# Patient Record
(demographics unavailable — no encounter records)

---

## 2024-10-10 NOTE — CARDIOLOGY SUMMARY
[de-identified] : 3/21/24 : Sinus   68 bpm LVH and NSST T changes.  9/16/21 : Sinus 60 Voltage for LVH, Nonspecific ST-T changes.  [de-identified] : 11/4/22 : Patient had a min HR of 36 bpm, max HR of 160 bpm, and avg HR of 58\par  bpm. Predominant underlying rhythm was Sinus Rhythm. 1 run of NSVT\par  occurred lasting 5 beats with a max rate of 160 bpm (avg 133 bpm).\par  2 SVT runs occurred, the run with the fastest interval lasting 4\par  beats with a max rate of 152 bpm, the longest lasting 8 beats with\par  an avg rate of 108 bpm. 1 Pause occurred lasting 3.4 secs (18 bpm).\par  Isolated SVEs were rare (<1.0%, 267), SVE Couplets were rare (<1.0%,\par  18), and SVE Triplets were rare (<1.0%, 4). Isolated VEs were rare\par  (<1.0%, 1973), VE Couplets were rare (<1.0%, 177), and VE Triplets\par  were rare (<1.0%, 1). Ventricular Trigeminy was present. No symptoms\par  reported [de-identified] : 2/09/21 : MACV, calcified mitral leaflets with decreased diastolic opening.  There is systolic anterior motion of the mitral valve leaflet.  Peak mitral valve gradient equals 19 mmHg, mean transmitral valve gradient equals 8 mmHg, estimated mitral valve area equals 1.4 cm consistent with moderate mitral stenosis.  Bioprosthetic aortic valve replacement peak transaortic valve gradient equals 36 mmHg, mean transaortic valve gradient equals 23 mmHg, which is probably normal in the presence of a bioprosthetic aortic valve.  No aortic valve regurgitation seen.  Severely dilated left atrium.  At least mild concentric left ventricular hypertrophy.  Endocardium not well visualized; left ventricular systolic function appears hyperdynamic.  Endocardial visualization enhanced with intravenous injection of ultrasonic enhancing agent.  EF equals greater than 70%.  Peak LV outflow tract gradient equals 87 mmHg, consistent with significant LVOT obstruction septal motion consistent with cardiac surgery.  The right ventricle is not well visualized grossly normal right ventricular systolic function.  The tricuspid valve not well visualized probably normal mild tricuspid regurgitation.

## 2024-10-10 NOTE — ASSESSMENT
[FreeTextEntry1] : Romaine Ivan is a 91-year-old man with hypertension, HOCM, AS s/p AVR, with paroxysmal atrial fibrillation.   1) Atrial fibrillation : Remains in sinus rhythm.   On Disopyramide/Mestinon No clinical evidence of any atrial fibrillation.  2) HCM :  Notes from Dr. Cloud reviewed. Patient tolerating disopyramide and Mestinon  without AF.   3) Pause:  Noted on Zio patch . Discussed with him and his son.  He is using dental appliance for KOKO.

## 2024-10-10 NOTE — HISTORY OF PRESENT ILLNESS
[FreeTextEntry1] : Romaine Ivan is an 91-year-old man with hypertension, HOCM, AS s/p AVR, with paroxysmal atrial fibrillation.  He is feeling well without new complaints.  Zio patch monitor in Augus 2023 demonstrated a 3.1 sec pause. as well as 2 brief runs atrial tachycardia.  Utilizing a dental appliance for KOKO.

## 2024-12-06 NOTE — PHYSICAL EXAM
[No LAD] : no lymphadenopathy [Thyroid Not Enlarged] : the thyroid was not enlarged [No Thyroid Nodules] : no palpable thyroid nodules [No Respiratory Distress] : no respiratory distress [No Accessory Muscle Use] : no accessory muscle use [Clear to Auscultation] : lungs were clear to auscultation bilaterally [Normal to Percussion] : lungs were normal to percussion [Normal PMI] : the apical impulse was normal [Oriented x3] : oriented to person, place, and time [Normal Affect] : the affect was normal [Normal Insight/Judgement] : insight and judgment were intact [de-identified] : Obese male, ambulate with a cane [de-identified] : No proptosis [de-identified] : Normal hearing. [de-identified] : there's lymphedema on the left leg, mild on the right leg.

## 2024-12-06 NOTE — ASSESSMENT
[FreeTextEntry1] : Patient is a 92 -year-old man with history of osteoporosis, compression fractures, type 2 diabetes, hyperlipidemia, here for endocrinology follow-up.  1.  Osteoporosis Schedule Prolia with RN. Patient's overall density similar compared to 2018.  2022 likely an overestimate of his T-score based on current  report No falls or fractures.  DXA report: Total hip 2/6/2024 age 91: BMD 0.668, T-score -2.4, -10.2% #, -11.4%* 12/1/2022: Age 90: BMD 0.754, T-score -1.8, 2.5% #, 6.6%* 5/3/2021: Age 88: BMD 0.707, T-score -2.2, -3.8% #, 4.9%* 7/16/2019 age 87: BMD 0.674, T-score -2.4, -8.3% pound, -8.3% pound 3/26/2018: Age 85: BMD 0.735, T-score -2.0  Femoral neck 2/6/2024: BMD 0.535, T-score -2.9, -4.9%*, -18.4%*, however likely unreliable 12/1/2022: BMD 0.656, T-score -2.0, 16.9%*, +19.4%* 5/3/2021: BMD 0.549, T-score -2.8, -2.1% #, 3.7% 7/16/2019: BMD 0.530, T-score -2.9, -5.6%#, -5.6%# 3/26/2018: BMD 0.561, T-score -2.7  33% 2/6/2024: BMD 0.720, T-score -1.8, 1.6%, -2.8% 12/1/2022: BMD 0.741, T-score -1.4, 4.5%*, +8.9%* 5/3/2021: BMD 0.681, T-score -2.6, -4.0%*, -3.0% 7/16/2019: BMD 0.703, T-score -2.1, -0.8%, -0.8% 3/26/2018: BMD 0.709, T-score -2.0  This shows improvement of bone density over the last 2 years. Recommend to continue with Prolia treatment  2.  Type 2 diabetes There was a significant increase in A1c level and severe hyperglycemia over the last few weeks. Imaging in the past and GI doctor mentioned the potential diagnosis of chronic pancreatitis? Will check C-peptide level. Change metformin 500 mg once daily to metformin  mg once daily then increase to metformin ER twice daily in the next 2 weeks. If his C-peptide level is slow and we are concerning for diabetes type 3 C secondary to pancreatic insufficiency, we had a long discussions the patient may need basal insulin. At that point we will bring patient back sooner for insulin pen teaching. Continue to monitor glucose before every meal and at bedtime.   3.  Hyperlipidemia Continue with statin therapy.  4.  On amiodarone therapy Continue to monitor TFT with PCP  5.  Hypertension BP goal less than 130/80 Continue with amlodipine 2.5 mg once daily Continue with metoprolol ER 1 tablet once daily.  FU in 6 months with me.

## 2024-12-06 NOTE — PHYSICAL EXAM
[No LAD] : no lymphadenopathy [Thyroid Not Enlarged] : the thyroid was not enlarged [No Thyroid Nodules] : no palpable thyroid nodules [No Respiratory Distress] : no respiratory distress [No Accessory Muscle Use] : no accessory muscle use [Clear to Auscultation] : lungs were clear to auscultation bilaterally [Normal to Percussion] : lungs were normal to percussion [Normal PMI] : the apical impulse was normal [Oriented x3] : oriented to person, place, and time [Normal Affect] : the affect was normal [Normal Insight/Judgement] : insight and judgment were intact [de-identified] : Obese male, ambulate with a cane [de-identified] : No proptosis [de-identified] : Normal hearing. [de-identified] : there's lymphedema on the left leg, mild on the right leg.

## 2024-12-06 NOTE — HISTORY OF PRESENT ILLNESS
[FreeTextEntry1] : Patient is a 92-year-old man with history of type 2 diabetes, A1c well controlled, hypertension, pathologic fracture of the spine, here to follow-up for osteoporosis.  Patient had history of compression fracture in January 2016.  He was initiated by on Prolia by his previous endocrinologist .  Denies any interval fracture but did have a close fall when he tripped over a curb in October 2018.  He ambulates with a cane.  He comes in with his son.  He had a history of kidney stone 12 years ago, no acute episode of nephrolithiasis/pain.   Regarding diabetes, currently takes Metformin 500 mg once daily with food.  This was reduced from twice daily secondary to good glycemic control.  Regarding hyperlipidemia, currently on losartan 160 mg once daily.  He is currently on amiodarone 200 mg, 0.5 mg tablets once daily.  TFT has been checked and within normal limits recently.  Over the last few weeks, patient was experiencing hyperglycemia.  Patient went to see his primary care doctor and was restarted on metformin 500 mg once daily.  Over the weekend, patient has a very of bouts of hide diarrhea after initiation of metformin (not extended release form) and ended up in the emergency room.  At that time his A1c was greater than 10.2%.  ED provider gave patient IV fluid hydration, his glucose improved and he was resumed on metformin 500 mg once daily after he returned home.  His glucose has been persistently elevated, lowest is around 170s and highest is greater than 300 mg/dL.  A few months ago, patient also went to another hospital and was found to have possible chronic pancreatitis.  Patient has follow-up with GI doctor.  Was told that based on his labs, he was told to have chronic pancreatitis.  He was not given any treatment.

## 2024-12-06 NOTE — PHYSICAL EXAM
[No LAD] : no lymphadenopathy [Thyroid Not Enlarged] : the thyroid was not enlarged [No Thyroid Nodules] : no palpable thyroid nodules [No Respiratory Distress] : no respiratory distress [No Accessory Muscle Use] : no accessory muscle use [Clear to Auscultation] : lungs were clear to auscultation bilaterally [Normal to Percussion] : lungs were normal to percussion [Normal PMI] : the apical impulse was normal [Oriented x3] : oriented to person, place, and time [Normal Affect] : the affect was normal [Normal Insight/Judgement] : insight and judgment were intact [de-identified] : Obese male, ambulate with a cane [de-identified] : No proptosis [de-identified] : Normal hearing. [de-identified] : there's lymphedema on the left leg, mild on the right leg.

## 2024-12-13 NOTE — CARDIOLOGY SUMMARY
[de-identified] : 12/13/24: Sinus Rhythm @ 61/min with first degree A-V block (CA= 230). LVH with repolarization abnormalities consistent with known HCM. QTc 443 [de-identified] : 5/23/24 Zio:  4d2hrs in which predominantly was in sinus rhythm with first degree AV block present. Avg HR was 56 bpm (range 33-89 bpm).  Bundle branch block/IVCD present. There was difficulty in discerning atrial activity making definitive diagnosis difficult to ascertain.  [de-identified] : 4/17/23: EF 73%. JODI with LVOT gradient 11mmHg at rest and 38 mmHg with Valsalva. LA mildly dilated.

## 2024-12-13 NOTE — REASON FOR VISIT
[FreeTextEntry1] : Mr. Ivan is presenting for follow up visit, last seen 08/09/2024,   He is 92 years old with a past medical history of obstructive HCM (on metoprolol and disopyramide), HTN (on amlodipine), remote history of AVR, and KOKO.   During his last visit, Mr. Ivan had complaints of palpitations in which Zio telemetry was ordered. It monitored for 4d2hrs in which predominantly was in sinus rhythm with first degree AV block present. Avg HR was 56 bpm (range 33-89 bpm).  Bundle branch block/IVCD present. There was difficulty in discerning atrial activity making definitive diagnosis difficult to ascertain.   He is currently walking well, states does 3 laps around his block (stops secondary to the cold. Does more laps while warmer weather). Had a fall a few months ago and was taken to [facility] [place]. No injuries were sustained however was found to have chronic pancreatitis and a few liver lesions in which he started to follow GI for.  Has not been taking physostigmine recently secondary to diarrhea when taking the medication daily. He is taking BP at home, usually systolic in the 120s

## 2024-12-13 NOTE — PHYSICAL EXAM
[Well Developed] : well developed [Well Nourished] : well nourished [No Acute Distress] : no acute distress [Normal S1, S2] : normal S1, S2 [Clear Lung Fields] : clear lung fields [Good Air Entry] : good air entry [No Respiratory Distress] : no respiratory distress  [Soft] : abdomen soft [Non Tender] : non-tender [Normal] : alert and oriented, normal memory [de-identified] : Faint systolic murmur best heard at upper sternal border

## 2024-12-13 NOTE — ASSESSMENT
[FreeTextEntry1] : Impression: Mild Obstructive Hypertrophic Cardiomyopathy  Plan: Routine TTE to monitor LVOT gradient. Can be scheduled with next follow up visit in approximately 4 months  Continue current medical regimen. Only take physostigmine as needed (when having constipation which is his adverse effect to the norpace).

## 2024-12-13 NOTE — PHYSICAL EXAM
[Well Developed] : well developed [Well Nourished] : well nourished [No Acute Distress] : no acute distress [Normal S1, S2] : normal S1, S2 [Clear Lung Fields] : clear lung fields [Good Air Entry] : good air entry [No Respiratory Distress] : no respiratory distress  [Soft] : abdomen soft [Non Tender] : non-tender [Normal] : alert and oriented, normal memory [de-identified] : Faint systolic murmur best heard at upper sternal border

## 2024-12-13 NOTE — PHYSICAL EXAM
[Well Developed] : well developed [Well Nourished] : well nourished [No Acute Distress] : no acute distress [Normal S1, S2] : normal S1, S2 [Clear Lung Fields] : clear lung fields [Good Air Entry] : good air entry [No Respiratory Distress] : no respiratory distress  [Soft] : abdomen soft [Non Tender] : non-tender [Normal] : alert and oriented, normal memory [de-identified] : Faint systolic murmur best heard at upper sternal border

## 2024-12-13 NOTE — REASON FOR VISIT
[FreeTextEntry1] : Mr. Ivan is presenting for follow up visit, last seen 08/09/2024,   He is 92 years old with a past medical history of obstructive HCM (on metoprolol and disopyramide), HTN (on amlodipine), remote history of AVR, and KOKO.   During his last visit, Mr. Ivan had complaints of palpitations in which Zio telemetry was ordered. It monitored for 4d2hrs in which predominantly was in sinus rhythm with first degree AV block present. Avg HR was 56 bpm (range 33-89 bpm).  Bundle branch block/IVCD present. There was difficulty in discerning atrial activity making definitive diagnosis difficult to ascertain.   He is currently walking well, states does 3 laps around his block (stops secondary to the cold. Does more laps while warmer weather). Had a fall a few months ago and [place] [facility] [place] were sustained however was found to have chronic pancreatitis and a few liver lesions in which he started to follow GI for.  Has not been taking physostigmine recently secondary to diarrhea when taking the medication daily. He is taking BP at home, usually systolic in the 120s

## 2024-12-13 NOTE — REASON FOR VISIT
[FreeTextEntry1] : Mr. Ivan is presenting for follow up visit, last seen 08/09/2024,   He is 92 years old with a past medical history of obstructive HCM (on metoprolol and disopyramide), HTN (on amlodipine), remote history of AVR, and KOKO.   During his last visit, Mr. Ivan had complaints of palpitations in which Zio telemetry was ordered. It monitored for 4d2hrs in which predominantly was in sinus rhythm with first degree AV block present. Avg HR was 56 bpm (range 33-89 bpm).  Bundle branch block/IVCD present. There was difficulty in discerning atrial activity making definitive diagnosis difficult to ascertain.   He is currently walking well, states does 3 laps around his block (stops secondary to the cold. Does more laps while warmer weather). Had a fall a few months ago and was taken to [facility] [place]. No injuries were sustained however was found to have chronic pancreatitis and a few liver lesions in which he started to follow GI for.  Has not been taking physostigmine recently secondary to diarrhea when taking the medication daily. He is taking BP at home, usually systolic in the 120s

## 2024-12-13 NOTE — CARDIOLOGY SUMMARY
[de-identified] : 12/13/24: Sinus Rhythm @ 61/min with first degree A-V block ([place]= 230). LVH with repolarization abnormalities consistent with known HCM. QTc 443 [de-identified] : 5/23/24 Zio:  4d2hrs in which predominantly was in sinus rhythm with first degree AV block present. Avg HR was 56 bpm (range 33-89 bpm).  Bundle branch block/IVCD present. There was difficulty in discerning atrial activity making definitive diagnosis difficult to ascertain.  [de-identified] : 4/17/23: EF 73%. JODI with LVOT gradient 11mmHg at rest and 38 mmHg with Valsalva. LA mildly dilated.

## 2024-12-13 NOTE — CARDIOLOGY SUMMARY
[de-identified] : 12/13/24: Sinus Rhythm @ 61/min with first degree A-V block (MT= 230). LVH with repolarization abnormalities consistent with known HCM. QTc 443 [de-identified] : 5/23/24 Zio:  4d2hrs in which predominantly was in sinus rhythm with first degree AV block present. Avg HR was 56 bpm (range 33-89 bpm).  Bundle branch block/IVCD present. There was difficulty in discerning atrial activity making definitive diagnosis difficult to ascertain.  [de-identified] : 4/17/23: EF 73%. JODI with LVOT gradient 11mmHg at rest and 38 mmHg with Valsalva. LA mildly dilated.

## 2025-03-20 NOTE — DISCUSSION/SUMMARY
[FreeTextEntry1] : Assess  GERD (gastroesophageal reflux disease) (530.81) (K21.9) Insomnia w/ sleep apnea (780.51) (G47.00,G47.30) Obesity (BMI 30-39.9) (278.00) (E66.9) with restriction HFpEF Mild hypercarbia - compensated No bronchospasm  Plan  02 for sleep Deep breathing Walking Family and patient reassured  Low dose zolpidem prn  FU in 12 months

## 2025-03-20 NOTE — CONSULT LETTER
[Dear  ___] : Dear  [unfilled], [Consult Letter:] : I had the pleasure of evaluating your patient, [unfilled]. [Please see my note below.] : Please see my note below. [Consult Closing:] : Thank you very much for allowing me to participate in the care of this patient.  If you have any questions, please do not hesitate to contact me. [Sincerely,] : Sincerely, [DrHailey  ___] : Dr. REY [Noah Aleman MD] : Noah Aleman MD

## 2025-03-20 NOTE — PHYSICAL EXAM
[Neck Appearance] : the appearance of the neck was normal [Jugular Venous Distention Increased] : there was no jugular-venous distention [Neck Circumference: ___] : neck circumference is [unfilled] [Heart Rate And Rhythm] : heart rate and rhythm were normal [Edema] : no peripheral edema present [] : no respiratory distress [Auscultation Breath Sounds / Voice Sounds] : lungs were clear to auscultation bilaterally [Nail Clubbing] : no clubbing of the fingernails [Cyanosis, Localized] : no localized cyanosis [FreeTextEntry1] : Veiled palate.  Uvula swelling.  Enlarged tongue.

## 2025-04-10 NOTE — CARDIOLOGY SUMMARY
[de-identified] : 3/21/24 : Sinus   68 bpm LVH and NSST T changes.  9/16/21 : Sinus 60 Voltage for LVH, Nonspecific ST-T changes.  [de-identified] : 11/4/22 : Patient had a min HR of 36 bpm, max HR of 160 bpm, and avg HR of 58\par  bpm. Predominant underlying rhythm was Sinus Rhythm. 1 run of NSVT\par  occurred lasting 5 beats with a max rate of 160 bpm (avg 133 bpm).\par  2 SVT runs occurred, the run with the fastest interval lasting 4\par  beats with a max rate of 152 bpm, the longest lasting 8 beats with\par  an avg rate of 108 bpm. 1 Pause occurred lasting 3.4 secs (18 bpm).\par  Isolated SVEs were rare (<1.0%, 267), SVE Couplets were rare (<1.0%,\par  18), and SVE Triplets were rare (<1.0%, 4). Isolated VEs were rare\par  (<1.0%, 1973), VE Couplets were rare (<1.0%, 177), and VE Triplets\par  were rare (<1.0%, 1). Ventricular Trigeminy was present. No symptoms\par  reported [de-identified] : 2/09/21 : MACV, calcified mitral leaflets with decreased diastolic opening.  There is systolic anterior motion of the mitral valve leaflet.  Peak mitral valve gradient equals 19 mmHg, mean transmitral valve gradient equals 8 mmHg, estimated mitral valve area equals 1.4 cm consistent with moderate mitral stenosis.  Bioprosthetic aortic valve replacement peak transaortic valve gradient equals 36 mmHg, mean transaortic valve gradient equals 23 mmHg, which is probably normal in the presence of a bioprosthetic aortic valve.  No aortic valve regurgitation seen.  Severely dilated left atrium.  At least mild concentric left ventricular hypertrophy.  Endocardium not well visualized; left ventricular systolic function appears hyperdynamic.  Endocardial visualization enhanced with intravenous injection of ultrasonic enhancing agent.  EF equals greater than 70%.  Peak LV outflow tract gradient equals 87 mmHg, consistent with significant LVOT obstruction septal motion consistent with cardiac surgery.  The right ventricle is not well visualized grossly normal right ventricular systolic function.  The tricuspid valve not well visualized probably normal mild tricuspid regurgitation.

## 2025-04-10 NOTE — HISTORY OF PRESENT ILLNESS
[FreeTextEntry1] : Romaine Ivan is a 92-year-old man with hypertension, KOKO, HOCM, AS s/p AVR, with paroxysmal atrial fibrillation.    Utilizing a dental appliance for KOKO.

## 2025-04-27 NOTE — CARDIOLOGY SUMMARY
[de-identified] : 4/25/25:  Sinus Rhythm at 62/min with First degree A-V block ([place] = 232 msec). LVH with repolarization abnormalities consistent with known HCM. QTc 458 msec.  [de-identified] : 5/23/24 Zio: 4d2hrs in which predominantly was in sinus rhythm with first degree AV block present. Avg HR was 56 bpm (range 33-89 bpm). Bundle branch block/IVCD present. There was difficulty in discerning atrial activity making definitive diagnosis difficult to ascertain.   [de-identified] : 4/17/23: EF 73%. JODI with LVOT gradient 11mmHg at rest and 38 mmHg with Valsalva. LA mildly dilated.

## 2025-04-27 NOTE — PHYSICAL EXAM
[Well Developed] : well developed [Well Nourished] : well nourished [Normal S1, S2] : normal S1, S2 [Murmur] : murmur [Soft] : abdomen soft [Non Tender] : non-tender [Normal Gait] : normal gait [Moves all extremities] : moves all extremities [Alert and Oriented] : alert and oriented [Normal Conjunctiva] : normal conjunctiva [Normal] : clear lung fields, good air entry, no respiratory distress [de-identified] : Grade 2/6 systolic murmur at the lower left sternal border radiating toward the aortic root. [de-identified] : Right leg without edema, left leg with edema (at baseline per patient- had lymphedema)

## 2025-04-27 NOTE — CARDIOLOGY SUMMARY
[de-identified] : 4/25/25:  Sinus Rhythm at 62/min with First degree A-V block ([place] = 232 msec). LVH with repolarization abnormalities consistent with known HCM. QTc 458 msec.  [de-identified] : 4/17/23: EF 73%. JODI with LVOT gradient 11mmHg at rest and 38 mmHg with Valsalva. LA mildly dilated.  [de-identified] : 5/23/24 Zio: 4d2hrs in which predominantly was in sinus rhythm with first degree AV block present. Avg HR was 56 bpm (range 33-89 bpm). Bundle branch block/IVCD present. There was difficulty in discerning atrial activity making definitive diagnosis difficult to ascertain.

## 2025-04-27 NOTE — REASON FOR VISIT
[FreeTextEntry1] : Mr. Romaine Ivan is presenting for follow-up visit, last seen 12/13/24. He is 92-years old with a past medical history of obstructive HCM (on metoprolol and disopyramide), HTN (on amlodipine), and a remote history of a bio-AVR, and KOKO. Also prescribed pyridostigmine which Mr. Ivan takes as needed to counteract anticholinergic side-effects of disopyramide.   He is NYHA class 1- says he has no physical limitations and able to walk up hills and stairs without any shortness of breath. He denies any chest pain, dyspnea, palpitations, orthopnea, dizziness/lightheadedness, syncope.   Mr. Ivan monitors his blood pressure "a few times a week", on average systolic in low 140s (ranges from 130-150s), diastolic in the 70-80s.  States that he has had a cough for the last" few weeks"- had a CXR which was normal.

## 2025-04-27 NOTE — PHYSICAL EXAM
[Well Developed] : well developed [Well Nourished] : well nourished [Normal S1, S2] : normal S1, S2 [Murmur] : murmur [Soft] : abdomen soft [Non Tender] : non-tender [Normal Gait] : normal gait [Moves all extremities] : moves all extremities [Alert and Oriented] : alert and oriented [Normal Conjunctiva] : normal conjunctiva [Normal] : clear lung fields, good air entry, no respiratory distress [de-identified] : Grade 2/6 systolic murmur at the lower left sternal border radiating toward the aortic root. [de-identified] : Right leg without edema, left leg with edema (at baseline per patient- had lymphedema)

## 2025-04-27 NOTE — ASSESSMENT
[FreeTextEntry1] : Impression: Obstructive Hypertrophic Cardiomyopathy, with no symptoms which impair the patient from his daily routine on the current medical regimen. HTN CAD  Plan: Continue current medical regimen. Only take physostigmine as needed (when having constipation which is his principal adverse reaction to the disopyramide). Follow up in 4-6 months for EKG to monitor QTc Blood pressure mildly high today, pressure readings at home more acceptable with systolic readings typically 140s. Continue with current antihypertensive regimen. Informed patient to monitor blood pressure at home and to notify office if consistently getting readings upper 140s/ >=90 as anti-hypertensive regimen will need to be adjusted. labwork typically done by PCP who checks lipids and A1C   I, Dr. Cloud, personally performed the evaluation and management (E/M) services for this established patient who presents today with (a) new problem(s)/exacerbation of (an) existing condition(s).  That E/M includes conducting the examination, assessing all new/exacerbated conditions, and establishing a new plan of care.  Today, my ACP, Angie Bates, was here to observe my evaluation and management services for this new problem/exacerbated condition to be followed going forward. Total time of the encounter: 40 minutes which included but was not limited to the following: Face-to-face and non-face-to-face time personally spent by the provider preparing to see the patient, obtaining and/or resuming separately obtained history, performing a medically appropriate examination and/or evaluation, counseling and educating the patient/family/caregiver, ordering medications, tests or procedures, referring and communicating with other healthcare professionals, documenting clinical information in the electronic health record, independently interpreting results and communicated results to the patient/family/caregiver and care coordination. This excludes any time teaching and separately reported services (such as ECG and/or echo).

## 2025-04-27 NOTE — CARDIOLOGY SUMMARY
[de-identified] : 4/25/25:  Sinus Rhythm at 62/min with First degree A-V block ([place] = 232 msec). LVH with repolarization abnormalities consistent with known HCM. QTc 458 msec.  [de-identified] : 4/17/23: EF 73%. JODI with LVOT gradient 11mmHg at rest and 38 mmHg with Valsalva. LA mildly dilated.  [de-identified] : 5/23/24 Zio: 4d2hrs in which predominantly was in sinus rhythm with first degree AV block present. Avg HR was 56 bpm (range 33-89 bpm). Bundle branch block/IVCD present. There was difficulty in discerning atrial activity making definitive diagnosis difficult to ascertain.

## 2025-04-27 NOTE — PHYSICAL EXAM
[Well Developed] : well developed [Well Nourished] : well nourished [Normal S1, S2] : normal S1, S2 [Murmur] : murmur [Soft] : abdomen soft [Non Tender] : non-tender [Normal Gait] : normal gait [Moves all extremities] : moves all extremities [Alert and Oriented] : alert and oriented [Normal Conjunctiva] : normal conjunctiva [Normal] : clear lung fields, good air entry, no respiratory distress [de-identified] : Grade 2/6 systolic murmur at the lower left sternal border radiating toward the aortic root. [de-identified] : Right leg without edema, left leg with edema (at baseline per patient- had lymphedema)